# Patient Record
Sex: FEMALE | Race: WHITE | URBAN - METROPOLITAN AREA
[De-identification: names, ages, dates, MRNs, and addresses within clinical notes are randomized per-mention and may not be internally consistent; named-entity substitution may affect disease eponyms.]

---

## 2022-03-23 ENCOUNTER — OFFICE VISIT (OUTPATIENT)
Dept: ORTHOPEDIC SURGERY | Age: 67
End: 2022-03-23

## 2022-03-23 VITALS — WEIGHT: 150 LBS | HEIGHT: 61 IN | BODY MASS INDEX: 28.32 KG/M2

## 2022-03-23 DIAGNOSIS — M54.50 CHRONIC BILATERAL LOW BACK PAIN, UNSPECIFIED WHETHER SCIATICA PRESENT: Primary | ICD-10-CM

## 2022-03-23 DIAGNOSIS — G89.29 CHRONIC BILATERAL LOW BACK PAIN, UNSPECIFIED WHETHER SCIATICA PRESENT: Primary | ICD-10-CM

## 2022-03-23 PROCEDURE — 1090F PRES/ABSN URINE INCON ASSESS: CPT | Performed by: PHYSICIAN ASSISTANT

## 2022-03-23 PROCEDURE — 99213 OFFICE O/P EST LOW 20 MIN: CPT | Performed by: PHYSICIAN ASSISTANT

## 2022-03-23 PROCEDURE — G8400 PT W/DXA NO RESULTS DOC: HCPCS | Performed by: PHYSICIAN ASSISTANT

## 2022-03-23 PROCEDURE — G8427 DOCREV CUR MEDS BY ELIG CLIN: HCPCS | Performed by: PHYSICIAN ASSISTANT

## 2022-03-23 PROCEDURE — G8536 NO DOC ELDER MAL SCRN: HCPCS | Performed by: PHYSICIAN ASSISTANT

## 2022-03-23 PROCEDURE — 1101F PT FALLS ASSESS-DOCD LE1/YR: CPT | Performed by: PHYSICIAN ASSISTANT

## 2022-03-23 PROCEDURE — G8419 CALC BMI OUT NRM PARAM NOF/U: HCPCS | Performed by: PHYSICIAN ASSISTANT

## 2022-03-23 PROCEDURE — G8432 DEP SCR NOT DOC, RNG: HCPCS | Performed by: PHYSICIAN ASSISTANT

## 2022-03-23 PROCEDURE — 3017F COLORECTAL CA SCREEN DOC REV: CPT | Performed by: PHYSICIAN ASSISTANT

## 2022-03-23 RX ORDER — MULTIVITAMIN
CAPSULE ORAL
COMMUNITY

## 2022-03-23 RX ORDER — ATENOLOL 25 MG/1
TABLET ORAL
COMMUNITY

## 2022-03-23 RX ORDER — LEVOTHYROXINE AND LIOTHYRONINE 57; 13.5 UG/1; UG/1
TABLET ORAL
COMMUNITY

## 2022-03-23 RX ORDER — CHOLECALCIFEROL (VITAMIN D3) 125 MCG
CAPSULE ORAL
COMMUNITY

## 2022-03-23 RX ORDER — ATORVASTATIN CALCIUM 10 MG/1
TABLET, FILM COATED ORAL
COMMUNITY
Start: 2022-02-15

## 2022-03-23 NOTE — PROGRESS NOTES
1. Have you been to the ER, urgent care clinic since your last visit? Hospitalized since your last visit? No    2. Have you seen or consulted any other health care providers outside of the [address] [place] [address] since your last visit? Include any pap smears or colon screening.  No    Chief Complaint   Patient presents with    Back Pain

## 2022-03-28 NOTE — PROGRESS NOTES
Chaka Ying (: 1955) is a 73 y.o. female patient here for evaluation of the following chief complaint(s):  Back Pain         ASSESSMENT/PLAN:  Below is the assessment and plan developed based on review of pertinent history, physical exam, labs, studies, and medications. 1. Chronic bilateral low back pain, unspecified whether sciatica present  -     XR SPINE LUMB MIN 4 V; Future  -     MRI LUMB SPINE WO CONT; Future      The patient's radiologic findings have been reviewed with her in detail today. She has a greater than 1 year history of chronic low back pain that has been progressively worsening with neurogenic claudication. She has tried and failed conservative treatment to include home exercises, Tylenol, and tramadol as prescribed by her PCP. I like for her to obtain MRI of the lumbar spine for further evaluation of compressive pathology. She may be a candidate for injection therapy. She will return for follow-up visit after her MRI is complete. Return for MRI follow up. SUBJECTIVE/OBJECTIVE:  Chaka Ying (: 1955) is a 73 y.o. female who presents today for the following:  Chief Complaint   Patient presents with    Back Pain        HPI   Ms. Jaqueline Enriquez presents today as a previous patient of practice. She presents today with concerns of a greater than 1 year history of progressively worsening chronic low back pain. She states that her back pain is intermittent in nature and is worse in particular with prolonged standing and walking. Her symptoms are improved with sitting. She reports intermittent left lower extremity numbness and tingling with subjective weakness and fatigue in her legs bilaterally. She states that she is able to ambulate consistently approximately 20 to 30 minutes. She has been performing home exercises faithfully and has been using Tylenol and tramadol as prescribed by her PCP.   She states that she has had no changes in bladder control, but has had alternating diarrhea and constipation recently. IMAGING:  XR Results (most recent):  Results from Appointment encounter on 03/23/22    XR SPINE LUMB MIN 4 V    Narrative  AP, lateral, flexion, and extension films of the lumbar spine reveal no evidence of acute fracture or lytic lesion. There is significant degenerative lumbar scoliosis with a levoconvex lumbar curvature measuring approximately 57 degrees. Moderate to severe multilevel disc degeneration and spondylosis is noted. MRI Results (most recent):  No results found for this or any previous visit. No Known Allergies    Current Outpatient Medications   Medication Sig    atenoloL (TENORMIN) 25 mg tablet atenolol 25 mg tablet   TAKE 1/2 TABLET BY MOUTH EVERY DAY    atorvastatin (LIPITOR) 10 mg tablet atorvastatin 10 mg tablet   TAKE 1 TABLET BY MOUTH DAILY AT BEDTIME FOR CHOLESTEROL    thyroid, Pork, ([place] Thyroid) 90 mg tablet [place] Thyroid 90 mg tablet   TAKE 1/2 TABLET BY MOUTH DAILY BEFORE A MEAL    multivitamin capsule multivitamin chewable tablet   Take 1 tablet every day by oral route.  cholecalciferol (VITAMIN D3) (5000 Units /125 mcg) capsule Vitamin D 5,000 unit tablet   Take 1 tablet every day by oral route.  PROGESTERONE 300 mg by Does Not Apply route. No current facility-administered medications for this visit. History reviewed. No pertinent past medical history. History reviewed. No pertinent surgical history. History reviewed. No pertinent family history. Social History     Tobacco Use    Smoking status: Never Smoker    Smokeless tobacco: Never Used   Substance Use Topics    Alcohol use: Not on file        Review of Systems   Constitutional: Negative. Respiratory: Negative. Cardiovascular: Negative. Gastrointestinal: Negative. Endocrine: Negative. Genitourinary: Negative. Musculoskeletal: Positive for back pain and gait problem. Skin: Negative.     Allergic/Immunologic: Negative. Neurological: Positive for weakness and numbness. Hematological: Negative. Psychiatric/Behavioral: Negative. All other systems reviewed and are negative. No flowsheet data found. Vitals:  Ht 5' 1\" (1.549 m)   Wt 150 lb (68 kg)   BMI 28.34 kg/m²    Body mass index is 28.34 kg/m². Physical Exam    Neurologic  Sensory  Light Touch - Intact - Globally. Overall Assessment of Muscle Strength and Tone reveals  Lower Extremities - Right Iliopsoas - 5/5. Left Iliopsoas - 5/5. Right Tibialis Anterior - 5/5. Left Tibialis Anterior - 5/5. Right Gastroc-Soleus - 5/5. Left Gastroc-Soleus - 5/5. Right EHL - 5/5. Left EHL - 5/5. General Assessment of Reflexes  Right Ankle - Clonus is not present. Left Ankle - Clonus is not present. Reflexes (Dermatomes)  2/2 Normal - Left Achilles (L5-S2), Left Knee (L2-4), Right Achilles (L5-S2) and Right Knee (L2-4). Musculoskeletal  Global Assessment  Examination of related systems reveals - well-developed, well-nourished, in no acute distress, alert and oriented x 3. Gait and Station - normal gait and station and normal posture. Right Lower Extremity - normal strength and tone, normal range of motion without pain and no instability, subluxation or laxity. Left Lower Extremity - normal strength and tone, normal range of motion without pain and no instability, subluxation or laxity. Spine/Ribs/Pelvis  Cervical Spine - Examination of the cervical spine reveals - no tenderness to palpation, no pain, no swelling, edema or erythema, normal cervical spine movements and normal sensation. Thoracic (Dorsal) Spine - Examination of the thoracic spine reveals - no tenderness over thoracic vertebrae, no pain, normal sensation and normal thoracic spine movements. Lumbosacral Spine - Examination of the lumbosacral spine reveals - no known fractures or deformities. Inspection and Palpation - Tenderness - moderate.  Assessment of pain reveals the following findings - The pain is characterized as - moderate. Location - pain refers to lower back bilaterally. ROJM - Trunk Extension - 15 degrees. Lumbar Spine Flexion - 35 °. Lumbosacral Spine - Functional Testing - Babinski Test negative, Prone Knee Bending Test negative, Slump Test negative, Straight Leg Raising Test negative. Dr. Bennett Landaverde was available for immediate consult during this encounter. An electronic signature was used to authenticate this note.   -- LULU Atkins

## 2022-03-29 ENCOUNTER — TELEPHONE (OUTPATIENT)
Dept: ORTHOPEDIC SURGERY | Age: 67
End: 2022-03-29

## 2022-05-06 ENCOUNTER — TELEPHONE (OUTPATIENT)
Dept: ORTHOPEDIC SURGERY | Age: 67
End: 2022-05-06

## 2022-05-06 DIAGNOSIS — G89.29 CHRONIC BILATERAL LOW BACK PAIN, UNSPECIFIED WHETHER SCIATICA PRESENT: Primary | ICD-10-CM

## 2022-05-06 DIAGNOSIS — M51.36 DDD (DEGENERATIVE DISC DISEASE), LUMBAR: ICD-10-CM

## 2022-05-06 DIAGNOSIS — M54.50 CHRONIC BILATERAL LOW BACK PAIN, UNSPECIFIED WHETHER SCIATICA PRESENT: Primary | ICD-10-CM

## 2022-06-30 DIAGNOSIS — G89.29 CHRONIC BILATERAL LOW BACK PAIN, UNSPECIFIED WHETHER SCIATICA PRESENT: ICD-10-CM

## 2022-06-30 DIAGNOSIS — M54.50 CHRONIC BILATERAL LOW BACK PAIN, UNSPECIFIED WHETHER SCIATICA PRESENT: ICD-10-CM

## 2022-06-30 DIAGNOSIS — M51.36 DDD (DEGENERATIVE DISC DISEASE), LUMBAR: ICD-10-CM

## 2022-07-13 ENCOUNTER — OFFICE VISIT (OUTPATIENT)
Dept: ORTHOPEDIC SURGERY | Age: 67
End: 2022-07-13
Payer: MEDICARE

## 2022-07-13 VITALS — WEIGHT: 150 LBS | BODY MASS INDEX: 28.32 KG/M2 | HEIGHT: 61 IN

## 2022-07-13 DIAGNOSIS — M54.50 CHRONIC BILATERAL LOW BACK PAIN, UNSPECIFIED WHETHER SCIATICA PRESENT: Primary | ICD-10-CM

## 2022-07-13 DIAGNOSIS — M51.36 DDD (DEGENERATIVE DISC DISEASE), LUMBAR: ICD-10-CM

## 2022-07-13 DIAGNOSIS — G89.29 CHRONIC BILATERAL LOW BACK PAIN, UNSPECIFIED WHETHER SCIATICA PRESENT: Primary | ICD-10-CM

## 2022-07-13 PROCEDURE — 3017F COLORECTAL CA SCREEN DOC REV: CPT | Performed by: PHYSICIAN ASSISTANT

## 2022-07-13 PROCEDURE — 1090F PRES/ABSN URINE INCON ASSESS: CPT | Performed by: PHYSICIAN ASSISTANT

## 2022-07-13 PROCEDURE — 99213 OFFICE O/P EST LOW 20 MIN: CPT | Performed by: PHYSICIAN ASSISTANT

## 2022-07-13 PROCEDURE — G8427 DOCREV CUR MEDS BY ELIG CLIN: HCPCS | Performed by: PHYSICIAN ASSISTANT

## 2022-07-13 PROCEDURE — G8536 NO DOC ELDER MAL SCRN: HCPCS | Performed by: PHYSICIAN ASSISTANT

## 2022-07-13 PROCEDURE — G8419 CALC BMI OUT NRM PARAM NOF/U: HCPCS | Performed by: PHYSICIAN ASSISTANT

## 2022-07-13 PROCEDURE — G8432 DEP SCR NOT DOC, RNG: HCPCS | Performed by: PHYSICIAN ASSISTANT

## 2022-07-13 PROCEDURE — G8400 PT W/DXA NO RESULTS DOC: HCPCS | Performed by: PHYSICIAN ASSISTANT

## 2022-07-13 PROCEDURE — 1123F ACP DISCUSS/DSCN MKR DOCD: CPT | Performed by: PHYSICIAN ASSISTANT

## 2022-07-13 PROCEDURE — 1101F PT FALLS ASSESS-DOCD LE1/YR: CPT | Performed by: PHYSICIAN ASSISTANT

## 2022-07-13 NOTE — PROGRESS NOTES
1. Have you been to the ER, urgent care clinic since your last visit? Hospitalized since your last visit? No    2. Have you seen or consulted any other health care providers outside of the [address] [place] [address] since your last visit? Include any pap smears or colon screening.  No    Chief Complaint   Patient presents with    Back Pain     Lumbar MRI Results @[facility] 6/23/22

## 2022-07-14 NOTE — PROGRESS NOTES
Yobani Hoffman (: 1955) is a 73 y.o. female patient here for evaluation of the following chief complaint(s):  Back Pain (Lumbar MRI Results @[facility] 22)         ASSESSMENT/PLAN:  Below is the assessment and plan developed based on review of pertinent history, physical exam, labs, studies, and medications. 1. Chronic bilateral low back pain, unspecified whether sciatica present  -     REFERRAL TO PHYSICAL THERAPY; Future  2. DDD (degenerative disc disease), lumbar  -     REFERRAL TO PHYSICAL THERAPY; Future      The patient's radiologic findings have been reviewed with her in detail today. She has persistent low back pain but no residual leg symptoms. We have discussed various treatment options to include injection therapy and physical therapy. She would like to hold off on injection therapy at this time, but may call back within the next 2 to 3 months if she wishes to proceed with injections. She has been given an order for outpatient physical therapy, and will continue with over-the-counter medications as needed as well as medications prescribed by her PCP. We will see her back for follow-up visit on an as-needed basis. Return if symptoms worsen or fail to improve. SUBJECTIVE/OBJECTIVE:  Yobani Hoffman (: 1955) is a 73 y.o. female who presents today for the following:  Chief Complaint   Patient presents with    Back Pain     Lumbar MRI Results @[facility] 22        HPI   Ms. Dylon Pablo returns today for a follow up visit for her recent MRI. Her history is noted below. She denies any leg pain or numbness as previous. She presents today with concerns of a greater than 1 year history of progressively worsening chronic low back pain. She states that her back pain is intermittent in nature and is worse in particular with prolonged standing and walking. Her symptoms are improved with sitting.   She reports intermittent left lower extremity numbness and tingling with subjective weakness and fatigue in her legs bilaterally. She states that she is able to ambulate consistently approximately 20 to 30 minutes. She has been performing home exercises faithfully and has been using Tylenol and tramadol as prescribed by her PCP. She states that she has had no changes in bladder control, but has had alternating diarrhea and constipation recently.       IMAGING:  XR Results (most recent):  Results from Appointment encounter on 03/23/22    XR SPINE LUMB MIN 4 V    Narrative  AP, lateral, flexion, and extension films of the lumbar spine reveal no evidence of acute fracture or lytic lesion. There is significant degenerative lumbar scoliosis with a levoconvex lumbar curvature measuring approximately 57 degrees. Moderate to severe multilevel disc degeneration and spondylosis is noted. MRI Results (most recent):  Results from Orders Only encounter on 06/30/22    MRI LUMB SPINE WO CONT       No Known Allergies    Current Outpatient Medications   Medication Sig    atenoloL (TENORMIN) 25 mg tablet atenolol 25 mg tablet   TAKE 1/2 TABLET BY MOUTH EVERY DAY    atorvastatin (LIPITOR) 10 mg tablet atorvastatin 10 mg tablet   TAKE 1 TABLET BY MOUTH DAILY AT BEDTIME FOR CHOLESTEROL    thyroid, Pork, ([place] Thyroid) 90 mg tablet [place] Thyroid 90 mg tablet   TAKE 1/2 TABLET BY MOUTH DAILY BEFORE A MEAL    multivitamin capsule multivitamin chewable tablet   Take 1 tablet every day by oral route.  cholecalciferol (VITAMIN D3) (5000 Units /125 mcg) capsule Vitamin D 5,000 unit tablet   Take 1 tablet every day by oral route.  PROGESTERONE 300 mg by Does Not Apply route. No current facility-administered medications for this visit. History reviewed. No pertinent past medical history. History reviewed. No pertinent surgical history. History reviewed. No pertinent family history.      Social History     Tobacco Use    Smoking status: Never Smoker    Smokeless tobacco: Never Used   Substance Use Topics    Alcohol use: Not on file        Review of Systems   Constitutional: Negative. Respiratory: Negative. Cardiovascular: Negative. Gastrointestinal: Negative. Endocrine: Negative. Genitourinary: Negative. Musculoskeletal: Positive for back pain and gait problem. Skin: Negative. Allergic/Immunologic: Negative. Hematological: Negative. Psychiatric/Behavioral: Negative. All other systems reviewed and are negative. No flowsheet data found. Vitals:  Ht 5' 1\" (1.549 m)   Wt 150 lb (68 kg)   BMI 28.34 kg/m²    Body mass index is 28.34 kg/m². Physical Exam    Neurologic  Sensory  Light Touch - Intact - Globally. Overall Assessment of Muscle Strength and Tone reveals  Lower Extremities - Right Iliopsoas - 5/5. Left Iliopsoas - 5/5. Right Tibialis Anterior - 5/5. Left Tibialis Anterior - 5/5. Right Gastroc-Soleus - 5/5. Left Gastroc-Soleus - 5/5. Right EHL - 5/5. Left EHL - 5/5. General Assessment of Reflexes  Right Ankle - Clonus is not present. Left Ankle - Clonus is not present. Reflexes (Dermatomes)  2/2 Normal - Left Achilles (L5-S2), Left Knee (L2-4), Right Achilles (L5-S2) and Right Knee (L2-4). Musculoskeletal  Global Assessment  Examination of related systems reveals - well-developed, well-nourished, in no acute distress, alert and oriented x 3. Gait and Station - normal gait and station and normal posture. Right Lower Extremity - normal strength and tone, normal range of motion without pain and no instability, subluxation or laxity. Left Lower Extremity - normal strength and tone, normal range of motion without pain and no instability, subluxation or laxity. Spine/Ribs/Pelvis  Cervical Spine - Examination of the cervical spine reveals - no tenderness to palpation, no pain, no swelling, edema or erythema, normal cervical spine movements and normal sensation.  Thoracic (Dorsal) Spine - Examination of the thoracic spine reveals - no tenderness over thoracic vertebrae, no pain, normal sensation and normal thoracic spine movements. Lumbosacral Spine - Examination of the lumbosacral spine reveals - no known fractures or deformities. Inspection and Palpation - Tenderness - moderate. Assessment of pain reveals the following findings - The pain is characterized as - moderate. Location - pain refers to lower back bilaterally. ROJM - Trunk Extension - 15 degrees. Lumbar Spine Flexion - 35 °. Lumbosacral Spine - Functional Testing - Babinski Test negative, Prone Knee Bending Test negative, Slump Test negative, Straight Leg Raising Test negative. Dr. Dada Finney was available for immediate consult during this encounter. An electronic signature was used to authenticate this note.   -- LULU Akbar

## 2022-11-29 DIAGNOSIS — M54.50 CHRONIC BILATERAL LOW BACK PAIN, UNSPECIFIED WHETHER SCIATICA PRESENT: Primary | ICD-10-CM

## 2022-11-29 DIAGNOSIS — M51.36 DDD (DEGENERATIVE DISC DISEASE), LUMBAR: ICD-10-CM

## 2022-11-29 DIAGNOSIS — G89.29 CHRONIC BILATERAL LOW BACK PAIN, UNSPECIFIED WHETHER SCIATICA PRESENT: Primary | ICD-10-CM

## 2023-03-02 ENCOUNTER — OFFICE VISIT (OUTPATIENT)
Dept: ORTHOPEDIC SURGERY | Age: 68
End: 2023-03-02

## 2023-03-02 VITALS — BODY MASS INDEX: 28.32 KG/M2 | HEIGHT: 61 IN | WEIGHT: 150 LBS

## 2023-03-02 DIAGNOSIS — M41.9 KYPHOSCOLIOSIS AND SCOLIOSIS: ICD-10-CM

## 2023-03-02 DIAGNOSIS — M54.50 CHRONIC BILATERAL LOW BACK PAIN, UNSPECIFIED WHETHER SCIATICA PRESENT: Primary | ICD-10-CM

## 2023-03-02 DIAGNOSIS — G89.29 CHRONIC BILATERAL LOW BACK PAIN, UNSPECIFIED WHETHER SCIATICA PRESENT: Primary | ICD-10-CM

## 2023-03-02 DIAGNOSIS — M51.36 DDD (DEGENERATIVE DISC DISEASE), LUMBAR: ICD-10-CM

## 2023-03-02 NOTE — PROGRESS NOTES
1. Have you been to the ER, urgent care clinic since your last visit? Hospitalized since your last visit? No    2. Have you seen or consulted any other health care providers outside of the [address] [place] [address] since your last visit? Include any pap smears or colon screening.  No    Chief Complaint   Patient presents with    Back Pain     Low Back, Left Leg Numbness

## 2023-03-02 NOTE — PROGRESS NOTES
Lori Simmons (: 1955) is a 78 y.o. female patient here for evaluation of the following chief complaint(s):  Back Pain (Low Back, Left Leg Numbness)         ASSESSMENT/PLAN:  Below is the assessment and plan developed based on review of pertinent history, physical exam, labs, studies, and medications. 1. Chronic bilateral low back pain, unspecified whether sciatica present  -     XR SPINE LUMB MIN 4 V; Future  -     REFERRAL TO PHYSICAL THERAPY; Future  2. DDD (degenerative disc disease), lumbar  -     REFERRAL TO PHYSICAL THERAPY; Future  3. Kyphoscoliosis and scoliosis  -     REFERRAL TO PHYSICAL THERAPY; Future      The patient's radiologic findings have been reviewed with her in detail today. She has been experiencing an acute flare of chronic low back pain with left lower extremity radicular symptoms. We have discussed various treatment options, and she would like to continue with conservative measures. She is not ready for injection therapy at this time. She would like to resume outpatient PT, and has been given an order for this today. She may use over-the-counter Tylenol, and may pursue further prescription for tramadol from her PCP if she wishes. I have encouraged her to return for annual follow-ups to monitor her curvature. She will return sooner with persistent or worsening symptoms. No follow-ups on file. SUBJECTIVE/OBJECTIVE:  Lori Simmons (: 1955) is a 78 y.o. female who presents today for the following:  Chief Complaint   Patient presents with    Back Pain     Low Back, Left Leg Numbness        Back Pain   Associated symptoms include numbness and weakness. Ms. Mercy Stone is today as a known patient to the practice. She has a longstanding history of kyphoscoliosis. She states that she had been doing well until December, when she began with a flare of low back pain and left lower extremity numbness and weakness.   She denies any injury prior to the increase of her symptoms. She reports intermittent right buttock discomfort but denies any radicular leg pain. She feels numbness in her entire leg and into the foot, but notes that this is intermittent in nature. She has been using Tylenol and tramadol as prescribed by her PCP. No bowel or bladder changes. IMAGING:  XR Results (most recent):  Results from Appointment encounter on 03/02/23    XR SPINE LUMB MIN 4 V    Narrative  AP, lateral, flexion, and extension films of the lumbar spine reveal severe thoracolumbar levoconvex scoliosis. This measures approximately 57 degrees, which remains unchanged in comparison to prior films. Moderate multilevel disc degeneration and spondylosis is noted throughout. MRI Results (most recent):  Results from Orders Only encounter on 06/30/22    MRI LUMB SPINE WO CONT   Incompletely visualized hyperintense T2 signal lesion in the distal aspect of the   thoracic cord of uncertain etiology. Correlate with patient history. Consider   further imaging with MRI with contrast.   No significant central canal stenosis in the lumbar spine. Severe levoscoliosis of   the thoracolumbar junction. Additional findings above. Electronically Signed by Karlene Bowers M.D. 6/28/2022 10:09 AM  Narrative    INDICATION:  Lower back pain for 3 years. History of scoliosis. TECHNIQUE:  Unenhanced multiplanar, multisequence MR imaging of the lumbar spine. COMPARISON:  None Available. FINDINGS:  Severe levoscoliosis of the thoracolumbar junction. .  Vertebral heights   are well maintained. Mildly heterogeneous bone marrow signal..  The partially   visualized thoracic cord demonstrates cystic changes hyperintense T2 signal lesion   measuring approximately 2.5 cm in length and approximately 4 mm in transverse   diameter. The conus terminates at L2. .     Right kidney anterior cortex hyperintense T2 signal lesion measuring approximately   1.3 cm in the midpole, compatible with cyst. Multilevel disc desiccation with sparing of the L3-L4 intervertebral disc. Multilevel disc height loss, most pronounced at L4-L5, L2-L3 and L1-L2,   mild-to-moderate in severity. At L1-2 there is mild disc bulge. Mild facet hypertrophy. Mild right lateral recess   effacement. No foraminal narrowing. At L2-3 there is mild disc bulge. Mild facet hypertrophy. No central canal   stenosis. Mild right foraminal narrowing. .     At L3-4 there is no central canal stenosis. No foraminal narrowing. .     At L4-5 there is mild disc bulge. Mild facet hypertrophy. No central canal   stenosis. No foraminal narrowing. .     At L5-S1 there is mild disc bulge. Mild facet hypertrophy. No thecal sac   compression. No foraminal narrowing. .  Procedure Note    Tony Fam - 06/28/2022   Formatting of this note might be different from the original.   INDICATION:  Lower back pain for 3 years. History of scoliosis. TECHNIQUE:  Unenhanced multiplanar, multisequence MR imaging of the lumbar spine. COMPARISON:  None Available. FINDINGS:  Severe levoscoliosis of the thoracolumbar junction. .  Vertebral heights   are well maintained. Mildly heterogeneous bone marrow signal..  The partially   visualized thoracic cord demonstrates cystic changes hyperintense T2 signal lesion   measuring approximately 2.5 cm in length and approximately 4 mm in transverse   diameter. The conus terminates at L2. .     Right kidney anterior cortex hyperintense T2 signal lesion measuring approximately   1.3 cm in the midpole, compatible with cyst.     Multilevel disc desiccation with sparing of the L3-L4 intervertebral disc. Multilevel disc height loss, most pronounced at L4-L5, L2-L3 and L1-L2,   mild-to-moderate in severity. At L1-2 there is mild disc bulge. Mild facet hypertrophy. Mild right lateral recess   effacement. No foraminal narrowing. At L2-3 there is mild disc bulge. Mild facet hypertrophy. No central canal   stenosis. Mild right foraminal narrowing. .     At L3-4 there is no central canal stenosis. No foraminal narrowing. .     At L4-5 there is mild disc bulge. Mild facet hypertrophy. No central canal   stenosis. No foraminal narrowing. .     At L5-S1 there is mild disc bulge. Mild facet hypertrophy. No thecal sac   compression. No foraminal narrowing. Kaia Batista IMPRESSION:   Incompletely visualized hyperintense T2 signal lesion in the distal aspect of the   thoracic cord of uncertain etiology. Correlate with patient history. Consider   further imaging with MRI with contrast.   No significant central canal stenosis in the lumbar spine. Severe levoscoliosis of   the thoracolumbar junction. Additional findings above. No Known Allergies    Current Outpatient Medications   Medication Sig    atenoloL (TENORMIN) 25 mg tablet atenolol 25 mg tablet   TAKE 1/2 TABLET BY MOUTH EVERY DAY    atorvastatin (LIPITOR) 10 mg tablet atorvastatin 10 mg tablet   TAKE 1 TABLET BY MOUTH DAILY AT BEDTIME FOR CHOLESTEROL    thyroid, Pork, (ARMOUR) 90 mg tablet [place] Thyroid 90 mg tablet   TAKE 1/2 TABLET BY MOUTH DAILY BEFORE A MEAL    multivitamin capsule multivitamin chewable tablet   Take 1 tablet every day by oral route. cholecalciferol (VITAMIN D3) (5000 Units /125 mcg) capsule Vitamin D 5,000 unit tablet   Take 1 tablet every day by oral route. PROGESTERONE 300 mg by Does Not Apply route. No current facility-administered medications for this visit. History reviewed. No pertinent past medical history. History reviewed. No pertinent surgical history. History reviewed. No pertinent family history. Social History     Tobacco Use    Smoking status: Never    Smokeless tobacco: Never   Substance Use Topics    Alcohol use: Not on file        Review of Systems   Constitutional: Negative. Respiratory: Negative. Cardiovascular: Negative. Gastrointestinal: Negative. Endocrine: Negative. Genitourinary: Negative. Musculoskeletal:  Positive for back pain. Skin: Negative. Allergic/Immunologic: Negative. Neurological:  Positive for weakness and numbness. Hematological: Negative. Psychiatric/Behavioral: Negative. All other systems reviewed and are negative. No flowsheet data found. Vitals:  Ht 5' 1\" (1.549 m)   Wt 150 lb (68 kg)   BMI 28.34 kg/m²    Body mass index is 28.34 kg/m². Physical Exam    Neurologic  Sensory  Light Touch - Intact - Globally. Overall Assessment of Muscle Strength and Tone reveals  Lower Extremities - Right Iliopsoas - 5/5. Left Iliopsoas - 5/5. Right Tibialis Anterior - 5/5. Left Tibialis Anterior - 5/5. Right Gastroc-Soleus - 5/5. Left Gastroc-Soleus - 5/5. Right EHL - 5/5. Left EHL - 5/5. General Assessment of Reflexes  Right Ankle - Clonus is not present. Left Ankle - Clonus is not present. Reflexes (Dermatomes)  2/2 Normal - Left Achilles (L5-S2), Left Knee (L2-4), Right Achilles (L5-S2) and Right Knee (L2-4). Musculoskeletal  Global Assessment  Examination of related systems reveals - well-developed, well-nourished, in no acute distress, alert and oriented x 3. Gait and Station - normal gait and station and normal posture. Right Lower Extremity - normal strength and tone, normal range of motion without pain and no instability, subluxation or laxity. Left Lower Extremity - normal strength and tone, normal range of motion without pain and no instability, subluxation or laxity. Spine/Ribs/Pelvis  Cervical Spine - Examination of the cervical spine reveals - no tenderness to palpation, no pain, no swelling, edema or erythema, normal cervical spine movements and normal sensation. Thoracic (Dorsal) Spine - Examination of the thoracic spine reveals - no tenderness over thoracic vertebrae, no pain, normal sensation and normal thoracic spine movements. Lumbosacral Spine - Examination of the lumbosacral spine reveals - no known fractures or deformities.  Inspection and Palpation - Tenderness - moderate. Assessment of pain reveals the following findings - The pain is characterized as - moderate. Location - pain refers to lower back bilaterally. ROJM - Trunk Extension - 15 degrees. Lumbar Spine Flexion - 35 °. Lumbosacral Spine - Functional Testing - Babinski Test negative, Prone Knee Bending Test negative, Slump Test negative, Straight Leg Raising Test negative. Kyphoscoliosis noted      Dr. Robert España was available for immediate consult during this encounter. An electronic signature was used to authenticate this note.   -- LULU Watson

## 2023-03-02 NOTE — LETTER
3/2/2023 2:10 PM    Ms. Nestor Du  [facility] [address] [facility] [id number]      . LONG      Sincerely,      LULU Gomez

## 2023-03-02 NOTE — LETTER
3/2/2023    Patient: Brenda Whitehead   YOB: 1955   Date of Visit: 3/2/2023     Azam Lowe MD  [facility] [id number]  Via Fax: [phone]    Dear Azam Lowe MD,      Thank you for referring Ms. Guillermo Cardenas to [facility] for evaluation. My notes for this consultation are attached. If you have questions, please do not hesitate to call me. I look forward to following your patient along with you.       Sincerely,    LULU Wilcox